# Patient Record
Sex: FEMALE | Race: OTHER | Employment: PART TIME | URBAN - METROPOLITAN AREA
[De-identification: names, ages, dates, MRNs, and addresses within clinical notes are randomized per-mention and may not be internally consistent; named-entity substitution may affect disease eponyms.]

---

## 2019-05-30 ENCOUNTER — OFFICE VISIT (OUTPATIENT)
Dept: GYNECOLOGY | Age: 34
End: 2019-05-30
Payer: COMMERCIAL

## 2019-05-30 VITALS
HEART RATE: 77 BPM | DIASTOLIC BLOOD PRESSURE: 68 MMHG | BODY MASS INDEX: 20.8 KG/M2 | WEIGHT: 113 LBS | HEIGHT: 62 IN | SYSTOLIC BLOOD PRESSURE: 96 MMHG | OXYGEN SATURATION: 97 %

## 2019-05-30 DIAGNOSIS — Z30.431 IUD CHECK UP: ICD-10-CM

## 2019-05-30 DIAGNOSIS — N76.0 ACUTE VAGINITIS: ICD-10-CM

## 2019-05-30 DIAGNOSIS — Z01.419 WELL WOMAN EXAM WITH ROUTINE GYNECOLOGICAL EXAM: Primary | ICD-10-CM

## 2019-05-30 DIAGNOSIS — Z11.3 SCREEN FOR STD (SEXUALLY TRANSMITTED DISEASE): ICD-10-CM

## 2019-05-30 PROCEDURE — 99385 PREV VISIT NEW AGE 18-39: CPT | Performed by: OBSTETRICS & GYNECOLOGY

## 2019-05-30 RX ORDER — METRONIDAZOLE 500 MG/1
500 TABLET ORAL 2 TIMES DAILY
Qty: 14 TABLET | Refills: 0 | Status: SHIPPED | OUTPATIENT
Start: 2019-05-30 | End: 2019-06-06

## 2019-05-30 RX ORDER — PNV NO.95/FERROUS FUM/FOLIC AC 28MG-0.8MG
TABLET ORAL
Qty: 30 TABLET | Refills: 12 | Status: SHIPPED | OUTPATIENT
Start: 2019-05-30 | End: 2022-08-13 | Stop reason: ALTCHOICE

## 2019-05-30 SDOH — HEALTH STABILITY: MENTAL HEALTH: HOW OFTEN DO YOU HAVE A DRINK CONTAINING ALCOHOL?: NEVER

## 2019-05-30 ASSESSMENT — ENCOUNTER SYMPTOMS
BLOOD IN STOOL: 0
VOMITING: 0
CHEST TIGHTNESS: 0
RECTAL PAIN: 0
COUGH: 0
COLOR CHANGE: 0
DIARRHEA: 0
ANAL BLEEDING: 0
PHOTOPHOBIA: 0
WHEEZING: 0
ABDOMINAL DISTENTION: 0
SORE THROAT: 0
CONSTIPATION: 0
TROUBLE SWALLOWING: 0
SHORTNESS OF BREATH: 0
ABDOMINAL PAIN: 0
BACK PAIN: 0
NAUSEA: 0
APNEA: 0

## 2019-05-30 NOTE — PROGRESS NOTES
Annual GYN Visit    Melinda Cristina  Date ofBirth:  1985    Date of Service:  2019    Chief Complaint:   Melinda Cristina is a 37 y.o.   female who presents for routine annual gynecologic visit and excessive vaginal discharge for many months     HPI:  Patient is premenopausal- LMP - one week ago, she reports regular q monthly menses with normal flow. Reports a lot of watery vaginal discharge, denies vaginal irritation or odor. Symptoms off and on for many months. Patient is  and monogamous. She denies mid-cycle bleeding. She states that she was treated for this in [place] 9 months ago with a medication for month. Copper IUD placed in [facility] 6 years ago. She does not want future fertility. Reports fatigue for past one month, seen on by PCP and per patient labs were normal recently . She is requesting a prescription for multi-vitamins     Health Maintenance   Topic Date Due    Varicella Vaccine (1 of 2 - 13+ 2-dose series) 1996    HIV screen  1998    DTaP/Tdap/Td vaccine (1 - Tdap) 2002    Cervical cancer screen  2004    Flu vaccine (Season Ended) 2019    Pneumococcal 0-64 years Vaccine  Aged Out       History reviewed. No pertinent past medical history. History reviewed. No pertinent surgical history.   OB History    Para Term  AB Living   2             SAB TAB Ectopic Molar Multiple Live Births             2      # Outcome Date GA Lbr Baltazar/2nd Weight Sex Delivery Anes PTL Lv   2             1 [place]              Social History     Socioeconomic History    Marital status:      Spouse name: Not on file    Number of children: Not on file    Years of education: Not on file    Highest education level: Not on file   Occupational History    Not on file   Social Needs    Financial resource strain: Not on file    Food insecurity:     Worry: Not on file     Inability: Not on file    Transportation needs:     Medical: Not on file Non-medical: Not on file   Tobacco Use    Smoking status: Never Smoker    Smokeless tobacco: Never Used   Substance and Sexual Activity    Alcohol use: Never     Frequency: Never    Drug use: Never    Sexual activity: Not Currently   Lifestyle    Physical activity:     Days per week: Not on file     Minutes per session: Not on file    Stress: Not on file   Relationships    Social connections:     Talks on phone: Not on file     Gets together: Not on file     Attends Gnosticist service: Not on file     Active member of club or organization: Not on file     Attends meetings of clubs or organizations: Not on file     Relationship status: Not on file    Intimate partner violence:     Fear of current or ex partner: Not on file     Emotionally abused: Not on file     Physically abused: Not on file     Forced sexual activity: Not on file   Other Topics Concern    Not on file   Social History Narrative    Not on file     No Known Allergies  Outpatient Medications Marked as Taking for the 5/30/19 encounter (Office Visit) with Jaelyn Malagon MD   Medication Sig Dispense Refill    metroNIDAZOLE (FLAGYL) 500 MG tablet Take 1 tablet by mouth 2 times daily for 7 days 14 tablet 0    Prenatal Vit-Fe Fumarate-FA (PRENATAL VITAMIN AND MINERAL) 28-0.8 MG TABS Take one pill po qd 30 tablet 12     History reviewed. No pertinent family history. Review of Systems:  Review of Systems   Constitutional: Positive for fatigue. Negative for appetite change, chills, fever and unexpected weight change. HENT: Negative for ear pain, hearing loss, mouth sores, sore throat and trouble swallowing. Eyes: Negative for photophobia and visual disturbance. Respiratory: Negative for apnea, cough, chest tightness, shortness of breath and wheezing. Cardiovascular: Negative for chest pain, palpitations and leg swelling.    Gastrointestinal: Negative for abdominal distention, abdominal pain, anal bleeding, blood in stool, constipation, diarrhea, nausea, rectal pain and vomiting. Endocrine: Negative for cold intolerance, heat intolerance, polydipsia, polyphagia and polyuria. Genitourinary: Positive for vaginal discharge. Negative for difficulty urinating, dyspareunia, dysuria, enuresis, frequency, genital sores, hematuria, menstrual problem, pelvic pain, urgency, vaginal bleeding and vaginal pain. Musculoskeletal: Negative for arthralgias, back pain, joint swelling and myalgias. Skin: Negative for color change and rash. Neurological: Negative for dizziness, seizures, syncope, light-headedness and headaches. Psychiatric/Behavioral: Negative for agitation, behavioral problems, confusion, decreased concentration, dysphoric mood, hallucinations, self-injury, sleep disturbance and suicidal ideas. The patient is not nervous/anxious and is not hyperactive. Physical Exam:  BP 96/68   Pulse 77   Ht 5' 2\" (1.575 m)   Wt 113 lb (51.3 kg)   SpO2 97%   BMI 20.67 kg/m²   Body mass index is 20.67 kg/m². Physical Exam   Constitutional: She appears well-developed and well-nourished. She is cooperative. No distress. HENT:   Head: Normocephalic and atraumatic. Mouth/Throat: Oropharynx is clear and moist.   Eyes: Conjunctivae are normal. Right eye exhibits no discharge. Left eye exhibits no discharge. No scleral icterus. Neck: No thyromegaly present. Cardiovascular: Normal rate, regular rhythm and normal heart sounds. Pulmonary/Chest: Effort normal and breath sounds normal. No breast tenderness, discharge or bleeding. Abdominal: Soft. Bowel sounds are normal. She exhibits no distension and no mass. There is no tenderness. There is no rebound and no guarding. Genitourinary: Uterus normal. Rectal exam shows no external hemorrhoid and no mass. No breast tenderness, discharge or bleeding. There is no rash, tenderness, lesion or injury on the right labia. There is no rash, tenderness, lesion or injury on the left labia. Uterus is not deviated, not enlarged, not fixed and not tender. Cervix exhibits no motion tenderness, no discharge and no friability. Right adnexum displays no mass, no tenderness and no fullness. Left adnexum displays no mass, no tenderness and no fullness. No erythema or tenderness in the vagina. No foreign body in the vagina. No signs of injury around the vagina. Vaginal discharge (copious thin white watery discharge with odor) found. Genitourinary Comments: IUD strings seen 2 cm long    Neurological: She is alert. Skin: Skin is warm. No rash noted. No erythema. No pallor. Psychiatric: She has a normal mood and affect. Her behavior is normal. Judgment and thought content normal.         Preventive Care and Risk Factor Assessment:  Hx abnormal PAP:no  Self-breast exams: Yes  FH of breast cancer: no  FH of GYN cancer: no  Hx of abnormal mammogram: N/A  Colonoscopy: N/A  Previous DEXA scan: N/A    Assessment/Plan:  1. Well woman exam with routine gynecological exam  - PAP SMEAR  - Prenatal Vit-Fe Fumarate-FA (PRENATAL VITAMIN AND MINERAL) 28-0.8 MG TABS; Take one pill po qd  Dispense: 30 tablet; Refill: 12  Self breast exam discussed and encouraged  Diet and exercise discussed  Discussed daily multi-vitamin and adequate calcium and vitamin D in diet   BCM - copper IUD placed in [facility] 6 years ago  Fatigue - follow-up with PCP   2. Screen for STD (sexually transmitted disease)  - C.trachomatis N.gonorrhoeae DNA  - Wet prep, genital    3. Acute vaginitis  - metroNIDAZOLE (FLAGYL) 500 MG tablet; Take 1 tablet by mouth 2 times daily for 7 days  Dispense: 14 tablet; Refill: 0    4. IUD check up  - US Pelvis Complete; Future      Return in about 3 weeks (around 6/20/2019).

## 2019-05-31 LAB
BACTERIA WET PREP: ABNORMAL
C TRACH DNA GENITAL QL NAA+PROBE: NEGATIVE
CLUE CELLS: ABNORMAL
EPITHELIAL CELLS WET PREP: ABNORMAL
N. GONORRHOEAE DNA: NEGATIVE
RBC WET PREP: ABNORMAL
SOURCE WET PREP: ABNORMAL
TRICHOMONAS PREP: ABNORMAL
WBC WET PREP: ABNORMAL
YEAST WET PREP: ABNORMAL

## 2019-06-03 ENCOUNTER — TELEPHONE (OUTPATIENT)
Dept: GYNECOLOGY | Age: 34
End: 2019-06-03

## 2019-06-03 LAB
HPV COMMENT: NORMAL
HPV TYPE 16: NOT DETECTED
HPV TYPE 18: NOT DETECTED
HPVOH (OTHER TYPES): NOT DETECTED

## 2019-06-11 ENCOUNTER — HOSPITAL ENCOUNTER (OUTPATIENT)
Dept: ULTRASOUND IMAGING | Age: 34
Discharge: HOME | End: 2019-06-11
Payer: COMMERCIAL

## 2019-06-11 DIAGNOSIS — Z30.431 IUD CHECK UP: ICD-10-CM

## 2019-06-11 PROCEDURE — 76856 US EXAM PELVIC COMPLETE: CPT

## 2022-08-13 ENCOUNTER — HOSPITAL ENCOUNTER (EMERGENCY)
Age: 37
Discharge: HOME | End: 2022-08-13

## 2022-08-13 VITALS
BODY MASS INDEX: 20.16 KG/M2 | HEART RATE: 73 BPM | OXYGEN SATURATION: 99 % | TEMPERATURE: 98.2 F | RESPIRATION RATE: 16 BRPM | WEIGHT: 121 LBS | HEIGHT: 65 IN | SYSTOLIC BLOOD PRESSURE: 121 MMHG | DIASTOLIC BLOOD PRESSURE: 56 MMHG

## 2022-08-13 DIAGNOSIS — R21 RASH AND OTHER NONSPECIFIC SKIN ERUPTION: Primary | ICD-10-CM

## 2022-08-13 DIAGNOSIS — L29.9 PRURITIC DISORDER: ICD-10-CM

## 2022-08-13 PROCEDURE — 99284 EMERGENCY DEPT VISIT MOD MDM: CPT

## 2022-08-13 PROCEDURE — 96372 THER/PROPH/DIAG INJ SC/IM: CPT

## 2022-08-13 PROCEDURE — 6360000002 HC RX W HCPCS: Performed by: PHYSICIAN ASSISTANT

## 2022-08-13 PROCEDURE — 6370000000 HC RX 637 (ALT 250 FOR IP): Performed by: PHYSICIAN ASSISTANT

## 2022-08-13 RX ORDER — FAMOTIDINE 20 MG/1
20 TABLET, FILM COATED ORAL 2 TIMES DAILY
Qty: 10 TABLET | Refills: 0 | Status: SHIPPED | OUTPATIENT
Start: 2022-08-13 | End: 2022-08-18

## 2022-08-13 RX ORDER — FAMOTIDINE 20 MG/1
20 TABLET, FILM COATED ORAL ONCE
Status: COMPLETED | OUTPATIENT
Start: 2022-08-13 | End: 2022-08-13

## 2022-08-13 RX ORDER — DIPHENHYDRAMINE HCL 25 MG
25 CAPSULE ORAL EVERY 4 HOURS PRN
Qty: 1 CAPSULE | Refills: 0 | Status: SHIPPED | OUTPATIENT
Start: 2022-08-13 | End: 2022-08-23

## 2022-08-13 RX ORDER — PREDNISONE 20 MG/1
60 TABLET ORAL ONCE
Status: COMPLETED | OUTPATIENT
Start: 2022-08-13 | End: 2022-08-13

## 2022-08-13 RX ORDER — DIPHENHYDRAMINE HYDROCHLORIDE 50 MG/ML
50 INJECTION INTRAMUSCULAR; INTRAVENOUS ONCE
Status: COMPLETED | OUTPATIENT
Start: 2022-08-13 | End: 2022-08-13

## 2022-08-13 RX ORDER — PREDNISONE 10 MG/1
60 TABLET ORAL DAILY
Qty: 24 TABLET | Refills: 0 | Status: SHIPPED | OUTPATIENT
Start: 2022-08-13 | End: 2022-08-17

## 2022-08-13 RX ADMIN — DIPHENHYDRAMINE HYDROCHLORIDE 50 MG: 50 INJECTION, SOLUTION INTRAMUSCULAR; INTRAVENOUS at 16:37

## 2022-08-13 RX ADMIN — FAMOTIDINE 20 MG: 20 TABLET, FILM COATED ORAL at 16:37

## 2022-08-13 RX ADMIN — PREDNISONE 60 MG: 20 TABLET ORAL at 16:38

## 2022-08-13 ASSESSMENT — PAIN - FUNCTIONAL ASSESSMENT
PAIN_FUNCTIONAL_ASSESSMENT: NONE - DENIES PAIN
PAIN_FUNCTIONAL_ASSESSMENT: NONE - DENIES PAIN

## 2022-08-13 NOTE — ED PROVIDER NOTES
history. CURRENTMEDICATIONS       Previous Medications    No medications on file         ALLERGIES     Patient has no known allergies. FAMILYHISTORY     History reviewed. No pertinent family history. SOCIAL HISTORY       Social History     Tobacco Use    Smoking status: Never    Smokeless tobacco: Never   Vaping Use    Vaping Use: Never used   Substance Use Topics    Alcohol use: Never    Drug use: Never       SCREENINGS    [place] Coma Scale  Eye Opening: Spontaneous  Best Verbal Response: Oriented  Best Motor Response: Obeys commands  [place] Coma Scale Score: 15        PHYSICAL EXAM    (up to 7 for level 4, 8 or more for level 5)     ED Triage Vitals   BP Temp Temp src Pulse Resp SpO2 Height Weight   -- -- -- -- -- -- -- --       Physical Exam  Vitals and nursing note reviewed. Constitutional:       Appearance: She is well-developed. She is not diaphoretic. HENT:      Head: Atraumatic. Nose: Nose normal.   Eyes:      General:         Right eye: No discharge. Left eye: No discharge. Cardiovascular:      Rate and Rhythm: Normal rate and regular rhythm. Heart sounds: No murmur heard. No friction rub. No gallop. Pulmonary:      Effort: Pulmonary effort is normal. No respiratory distress. Breath sounds: No stridor. No wheezing, rhonchi or rales. Abdominal:      General: Bowel sounds are normal. There is no distension. Palpations: Abdomen is soft. There is no mass. Tenderness: no abdominal tenderness There is no guarding or rebound. Hernia: No hernia is present. Musculoskeletal:         General: No swelling. Normal range of motion. Cervical back: Normal range of motion. Skin:     General: Skin is warm and dry. Findings: Erythema present. No rash. Comments: There is some mild erythema over the palms bilaterally some mild nonpalpable blanching erythema over the right lower leg also. No urticaria, vesicles, warmth.    Neurological: Mental Status: She is alert and oriented to person, place, and time. Cranial Nerves: No cranial nerve deficit. Psychiatric:         Behavior: Behavior normal.       DIAGNOSTIC RESULTS   LABS:    Labs Reviewed - No data to display    When ordered only abnormal lab results are displayed. All other labs were within normal range or not returned as of this dictation. EKG: When ordered, EKG's are interpreted by the Emergency Department Physician in the absence of a cardiologist.  Please see their note for interpretation of EKG. RADIOLOGY:   Non-plain film images such as CT, Ultrasound and MRI are read by the radiologist. Plain radiographic images are visualized and preliminarily interpreted by the ED Provider with the below findings:        Interpretation per the Radiologist below, if available at the time of this note:    No orders to display     No results found. PROCEDURES   Unless otherwise noted below, none     Procedures    CRITICAL CARE TIME       CONSULTS:  None      EMERGENCY DEPARTMENT COURSE and DIFFERENTIAL DIAGNOSIS/MDM:   Vitals:    Vitals:    08/13/22 1634 08/13/22 1720   BP: (!) 121/56    Pulse: 97 73   Resp: 16    Temp: 98.2 °F (36.8 °C)    TempSrc: Oral    SpO2: 99%    Weight: 121 lb (54.9 kg)    Height: 5' 5\" (1.651 m)        Patient was given the following medications:  Medications   predniSONE (DELTASONE) tablet 60 mg (60 mg Oral Given 8/13/22 1638)   diphenhydrAMINE (BENADRYL) injection 50 mg (50 mg IntraMUSCular Given 8/13/22 1637)   famotidine (PEPCID) tablet 20 mg (20 mg Oral Given 8/13/22 1637)         Is this patient to be included in the SEP-1 Core Measure due to severe sepsis or septic shock? No   Exclusion criteria - the patient is NOT to be included for SEP-1 Core Measure due to: Infection is not suspected    Patient presented with an itchy rash and generalized itching. He has some mild erythema over palms and lower leg.   Recheck of the patient after Benadryl, steroids and Pepcid she is feeling better. Rash is improved. Nothing to suggest anaphylaxis, Hammer-Albino syndrome, necrotizing fasciitis, cellulitis or other emergent etiology. Will be discharged home with Benadryl, Pepcid and prednisone. Follow-up as an outpatient return here for any worsening of symptoms or problems at home. FINAL IMPRESSION      1. Rash and other nonspecific skin eruption    2. Pruritic disorder          DISPOSITION/PLAN   DISPOSITION Decision To Discharge 08/13/2022 05:16:39 PM      PATIENT REFERRED TO:  [facility] Emergency Department  [address]  [phone]    As needed    [facility]) Pre-Services  [phone]        DISCHARGE MEDICATIONS:  New Prescriptions    DIPHENHYDRAMINE (BENADRYL) 25 MG CAPSULE    Take 1 capsule by mouth every 4 hours as needed for Itching    FAMOTIDINE (PEPCID) 20 MG TABLET    Take 1 tablet by mouth in the morning and 1 tablet before bedtime. Do all this for 5 days. PREDNISONE (DELTASONE) 10 MG TABLET    Take 6 tablets by mouth in the morning for 4 doses.        DISCONTINUED MEDICATIONS:  Discontinued Medications    PRENATAL VIT-FE FUMARATE-FA (PRENATAL VITAMIN AND MINERAL) 28-0.8 MG TABS    Take one pill po qd              (Please note that portions of this note were completed with a voice recognition program.  Efforts were made to edit the dictations but occasionally words are mis-transcribed.)    Garret Capps PA-C (electronically signed)           Garret Capps PA-C  08/13/22 [id number]

## 2024-01-14 ENCOUNTER — HOSPITAL ENCOUNTER (EMERGENCY)
Age: 39
Discharge: HOME | End: 2024-01-14
Payer: COMMERCIAL

## 2024-01-14 ENCOUNTER — APPOINTMENT (OUTPATIENT)
Dept: GENERAL RADIOLOGY | Age: 39
End: 2024-01-14
Payer: COMMERCIAL

## 2024-01-14 VITALS
WEIGHT: 127 LBS | BODY MASS INDEX: 21.16 KG/M2 | HEART RATE: 99 BPM | DIASTOLIC BLOOD PRESSURE: 65 MMHG | OXYGEN SATURATION: 100 % | TEMPERATURE: 99.1 F | HEIGHT: 65 IN | SYSTOLIC BLOOD PRESSURE: 112 MMHG | RESPIRATION RATE: 16 BRPM

## 2024-01-14 DIAGNOSIS — U07.1 COVID-19: Primary | ICD-10-CM

## 2024-01-14 LAB
FLUAV RNA RESP QL NAA+PROBE: NOT DETECTED
FLUBV RNA RESP QL NAA+PROBE: NOT DETECTED
SARS-COV-2 RNA RESP QL NAA+PROBE: DETECTED

## 2024-01-14 PROCEDURE — 99284 EMERGENCY DEPT VISIT MOD MDM: CPT

## 2024-01-14 PROCEDURE — 71046 X-RAY EXAM CHEST 2 VIEWS: CPT

## 2024-01-14 PROCEDURE — 87636 SARSCOV2 & INF A&B AMP PRB: CPT

## 2024-01-14 PROCEDURE — 6370000000 HC RX 637 (ALT 250 FOR IP): Performed by: PHYSICIAN ASSISTANT

## 2024-01-14 RX ORDER — IBUPROFEN 600 MG/1
600 TABLET ORAL 3 TIMES DAILY PRN
Qty: 30 TABLET | Refills: 0 | Status: SHIPPED | OUTPATIENT
Start: 2024-01-14

## 2024-01-14 RX ORDER — IBUPROFEN 400 MG/1
400 TABLET ORAL ONCE
Status: COMPLETED | OUTPATIENT
Start: 2024-01-14 | End: 2024-01-14

## 2024-01-14 RX ORDER — ACETAMINOPHEN 500 MG
1000 TABLET ORAL ONCE
Status: COMPLETED | OUTPATIENT
Start: 2024-01-14 | End: 2024-01-14

## 2024-01-14 RX ORDER — ACETAMINOPHEN 325 MG/1
650 TABLET ORAL EVERY 6 HOURS PRN
Qty: 120 TABLET | Refills: 3 | Status: SHIPPED | OUTPATIENT
Start: 2024-01-14

## 2024-01-14 RX ADMIN — IBUPROFEN 400 MG: 400 TABLET, FILM COATED ORAL at 10:44

## 2024-01-14 RX ADMIN — ACETAMINOPHEN 1000 MG: 500 TABLET ORAL at 10:44

## 2024-01-14 ASSESSMENT — LIFESTYLE VARIABLES
HOW OFTEN DO YOU HAVE A DRINK CONTAINING ALCOHOL: NEVER
HOW MANY STANDARD DRINKS CONTAINING ALCOHOL DO YOU HAVE ON A TYPICAL DAY: PATIENT DOES NOT DRINK

## 2024-01-14 ASSESSMENT — PAIN SCALES - GENERAL
PAINLEVEL_OUTOF10: 5
PAINLEVEL_OUTOF10: 5

## 2024-01-14 ASSESSMENT — PAIN - FUNCTIONAL ASSESSMENT: PAIN_FUNCTIONAL_ASSESSMENT: 0-10

## 2024-01-14 ASSESSMENT — PAIN DESCRIPTION - LOCATION: LOCATION: HEAD

## 2024-01-14 NOTE — ED PROVIDER NOTES
[facility] EMERGENCY DEPARTMENT  EMERGENCY DEPARTMENT ENCOUNTER        Pt Name: Hayden Cisneros  MRN: [id number]  Birthdate 1985  Date of evaluation: 1/14/2024  Provider: JUAN CARLOS Early  PCP: No primary care provider on file.  Note Started: 12:16 PM EST 1/14/24      SEAN. I have evaluated this patient.        CHIEF COMPLAINT       Chief Complaint   Patient presents with    Headache     Pt with headache, cold symptoms and fever at night x2 days       HISTORY OF PRESENT ILLNESS: 1 or more Elements     History from : Patient    Limitations to history : None    Hayden Cisneros is a 38 y.o. female who presents to the emergency room due to headache, runny nose, sinus congestion and fever starting 2 nights ago.  She denies any chest pain, shortness of breath or difficulty breathing associate with this.  She denies any known sick contacts.    Nursing Notes were all reviewed and agreed with or any disagreements were addressed in the HPI.    REVIEW OF SYSTEMS :      Review of Systems   Constitutional:  Positive for chills and fever. Negative for diaphoresis.   HENT:  Positive for congestion. Negative for rhinorrhea and sore throat.    Eyes:  Negative for pain and visual disturbance.   Respiratory:  Negative for cough and shortness of breath.    Cardiovascular:  Negative for chest pain and leg swelling.   Gastrointestinal:  Negative for abdominal pain, constipation, diarrhea, nausea and vomiting.   Genitourinary:  Negative for difficulty urinating, dysuria and frequency.   Musculoskeletal:  Negative for back pain and neck pain.   Skin:  Negative for rash and wound.   Neurological:  Positive for headaches. Negative for dizziness and light-headedness.       Positives and Pertinent negatives as per HPI.     SURGICAL HISTORY   History reviewed. No pertinent surgical history.    CURRENTMEDICATIONS       Discharge Medication List as of 1/14/2024 11:23 AM        CONTINUE these medications which have NOT CHANGED